# Patient Record
Sex: MALE | Race: BLACK OR AFRICAN AMERICAN | NOT HISPANIC OR LATINO
[De-identification: names, ages, dates, MRNs, and addresses within clinical notes are randomized per-mention and may not be internally consistent; named-entity substitution may affect disease eponyms.]

---

## 2018-02-06 ENCOUNTER — LABORATORY RESULT (OUTPATIENT)
Age: 33
End: 2018-02-06

## 2018-02-06 ENCOUNTER — TRANSCRIPTION ENCOUNTER (OUTPATIENT)
Age: 33
End: 2018-02-06

## 2018-02-06 ENCOUNTER — APPOINTMENT (OUTPATIENT)
Dept: INTERNAL MEDICINE | Facility: CLINIC | Age: 33
End: 2018-02-06
Payer: COMMERCIAL

## 2018-02-06 ENCOUNTER — FORM ENCOUNTER (OUTPATIENT)
Age: 33
End: 2018-02-06

## 2018-02-06 VITALS
SYSTOLIC BLOOD PRESSURE: 120 MMHG | HEART RATE: 75 BPM | OXYGEN SATURATION: 97 % | DIASTOLIC BLOOD PRESSURE: 90 MMHG | WEIGHT: 242 LBS | HEIGHT: 69 IN | BODY MASS INDEX: 35.84 KG/M2 | TEMPERATURE: 98.9 F

## 2018-02-06 DIAGNOSIS — Z78.9 OTHER SPECIFIED HEALTH STATUS: ICD-10-CM

## 2018-02-06 DIAGNOSIS — Z83.3 FAMILY HISTORY OF DIABETES MELLITUS: ICD-10-CM

## 2018-02-06 DIAGNOSIS — Z82.3 FAMILY HISTORY OF STROKE: ICD-10-CM

## 2018-02-06 PROCEDURE — 99385 PREV VISIT NEW AGE 18-39: CPT

## 2018-02-07 ENCOUNTER — APPOINTMENT (OUTPATIENT)
Dept: ULTRASOUND IMAGING | Facility: CLINIC | Age: 33
End: 2018-02-07
Payer: COMMERCIAL

## 2018-02-07 ENCOUNTER — OUTPATIENT (OUTPATIENT)
Dept: OUTPATIENT SERVICES | Facility: HOSPITAL | Age: 33
LOS: 1 days | End: 2018-02-07
Payer: COMMERCIAL

## 2018-02-07 DIAGNOSIS — Z00.8 ENCOUNTER FOR OTHER GENERAL EXAMINATION: ICD-10-CM

## 2018-02-07 LAB
HBV SURFACE AB SER QL: REACTIVE
HBV SURFACE AG SER QL: NONREACTIVE
HCV AB SER QL: NONREACTIVE
HCV S/CO RATIO: 0.23 S/CO
HIV1+2 AB SPEC QL IA.RAPID: NONREACTIVE
T PALLIDUM AB SER QL IA: NEGATIVE

## 2018-02-07 PROCEDURE — 76870 US EXAM SCROTUM: CPT | Mod: 26

## 2018-02-07 PROCEDURE — 76870 US EXAM SCROTUM: CPT

## 2018-02-08 LAB
25(OH)D3 SERPL-MCNC: 22.6 NG/ML
ALBUMIN SERPL ELPH-MCNC: 4.4 G/DL
ALP BLD-CCNC: 57 U/L
ALT SERPL-CCNC: 33 U/L
ANION GAP SERPL CALC-SCNC: 16 MMOL/L
APPEARANCE: CLEAR
AST SERPL-CCNC: 36 U/L
BASOPHILS # BLD AUTO: 0.02 K/UL
BASOPHILS NFR BLD AUTO: 0.2 %
BILIRUB SERPL-MCNC: 0.3 MG/DL
BILIRUBIN URINE: NEGATIVE
BLOOD URINE: NEGATIVE
BUN SERPL-MCNC: 19 MG/DL
C TRACH RRNA SPEC QL NAA+PROBE: NOT DETECTED
CALCIUM SERPL-MCNC: 10.6 MG/DL
CHLORIDE SERPL-SCNC: 97 MMOL/L
CHOLEST SERPL-MCNC: 237 MG/DL
CHOLEST/HDLC SERPL: 3.2 RATIO
CO2 SERPL-SCNC: 25 MMOL/L
COLOR: YELLOW
CREAT SERPL-MCNC: 1.35 MG/DL
EOSINOPHIL # BLD AUTO: 0.12 K/UL
EOSINOPHIL NFR BLD AUTO: 1.4 %
GLUCOSE QUALITATIVE U: NEGATIVE MG/DL
GLUCOSE SERPL-MCNC: 88 MG/DL
HBA1C MFR BLD HPLC: 6 %
HCT VFR BLD CALC: 46.3 %
HDLC SERPL-MCNC: 74 MG/DL
HGB BLD-MCNC: 15.8 G/DL
IMM GRANULOCYTES NFR BLD AUTO: 0.1 %
KETONES URINE: NEGATIVE
LDLC SERPL CALC-MCNC: 151 MG/DL
LEUKOCYTE ESTERASE URINE: NEGATIVE
LYMPHOCYTES # BLD AUTO: 3.38 K/UL
LYMPHOCYTES NFR BLD AUTO: 40.5 %
MAN DIFF?: NORMAL
MCHC RBC-ENTMCNC: 28.3 PG
MCHC RBC-ENTMCNC: 34.1 GM/DL
MCV RBC AUTO: 83 FL
MONOCYTES # BLD AUTO: 0.64 K/UL
MONOCYTES NFR BLD AUTO: 7.7 %
N GONORRHOEA RRNA SPEC QL NAA+PROBE: NOT DETECTED
NEUTROPHILS # BLD AUTO: 4.18 K/UL
NEUTROPHILS NFR BLD AUTO: 50.1 %
NITRITE URINE: NEGATIVE
PH URINE: 6
PLATELET # BLD AUTO: 272 K/UL
POTASSIUM SERPL-SCNC: 3.9 MMOL/L
PROT SERPL-MCNC: 8.7 G/DL
PROTEIN URINE: NEGATIVE MG/DL
RBC # BLD: 5.58 M/UL
RBC # FLD: 13.6 %
SODIUM SERPL-SCNC: 138 MMOL/L
SOURCE AMPLIFICATION: NORMAL
SPECIFIC GRAVITY URINE: 1.02
TRIGL SERPL-MCNC: 62 MG/DL
TSH SERPL-ACNC: 1.83 UIU/ML
UROBILINOGEN URINE: NEGATIVE MG/DL
WBC # FLD AUTO: 8.35 K/UL

## 2018-04-10 ENCOUNTER — APPOINTMENT (OUTPATIENT)
Dept: INTERNAL MEDICINE | Facility: CLINIC | Age: 33
End: 2018-04-10

## 2021-02-17 ENCOUNTER — TRANSCRIPTION ENCOUNTER (OUTPATIENT)
Age: 36
End: 2021-02-17

## 2021-03-02 ENCOUNTER — APPOINTMENT (OUTPATIENT)
Dept: INTERNAL MEDICINE | Facility: CLINIC | Age: 36
End: 2021-03-02
Payer: MEDICAID

## 2021-03-02 VITALS
BODY MASS INDEX: 36.14 KG/M2 | WEIGHT: 244 LBS | OXYGEN SATURATION: 98 % | SYSTOLIC BLOOD PRESSURE: 140 MMHG | TEMPERATURE: 98.3 F | HEART RATE: 78 BPM | DIASTOLIC BLOOD PRESSURE: 80 MMHG | HEIGHT: 69 IN

## 2021-03-02 VITALS — SYSTOLIC BLOOD PRESSURE: 118 MMHG | DIASTOLIC BLOOD PRESSURE: 76 MMHG

## 2021-03-02 DIAGNOSIS — K01.1 IMPACTED TEETH: ICD-10-CM

## 2021-03-02 DIAGNOSIS — Z23 ENCOUNTER FOR IMMUNIZATION: ICD-10-CM

## 2021-03-02 DIAGNOSIS — Z56.0 UNEMPLOYMENT, UNSPECIFIED: ICD-10-CM

## 2021-03-02 DIAGNOSIS — Z00.00 ENCOUNTER FOR GENERAL ADULT MEDICAL EXAMINATION W/OUT ABNORMAL FINDINGS: ICD-10-CM

## 2021-03-02 DIAGNOSIS — Z11.3 ENCOUNTER FOR SCREENING FOR INFECTIONS WITH A PREDOMINANTLY SEXUAL MODE OF TRANSMISSION: ICD-10-CM

## 2021-03-02 PROCEDURE — 99072 ADDL SUPL MATRL&STAF TM PHE: CPT

## 2021-03-02 PROCEDURE — 90472 IMMUNIZATION ADMIN EACH ADD: CPT

## 2021-03-02 PROCEDURE — 90715 TDAP VACCINE 7 YRS/> IM: CPT

## 2021-03-02 PROCEDURE — G0008: CPT

## 2021-03-02 PROCEDURE — 99395 PREV VISIT EST AGE 18-39: CPT | Mod: 25

## 2021-03-02 PROCEDURE — 90686 IIV4 VACC NO PRSV 0.5 ML IM: CPT

## 2021-03-02 SDOH — ECONOMIC STABILITY - INCOME SECURITY: UNEMPLOYMENT, UNSPECIFIED: Z56.0

## 2021-03-02 NOTE — COUNSELING
[Potential consequences of obesity discussed] : Potential consequences of obesity discussed [Benefits of weight loss discussed] : Benefits of weight loss discussed [Structured Weight Management Program suggested:] : Structured weight management program suggested [Encouraged to maintain food diary] : Encouraged to maintain food diary [Encouraged to increase physical activity] : Encouraged to increase physical activity [Encouraged to use exercise tracking device] : Encouraged to use exercise tracking device [Weigh Self Weekly] : weigh self weekly [Decrease Portions] : decrease portions [None] : None [Good understanding] : Patient has a good understanding of lifestyle changes and steps needed to achieve self management goal

## 2021-03-02 NOTE — HEALTH RISK ASSESSMENT
[Fair] :  ~his/her~ mood as fair [Yes] : Yes [2 - 4 times a month (2 pts)] : 2-4 times a month (2 points) [3 or 4 (1 pt)] : 3 or 4  (1 point) [Less than monthly (1 pt)] : Less than monthly (1 point) [No] : In the past 12 months have you used drugs other than those required for medical reasons? No [] : No [de-identified] : On rare occasions. [de-identified] : kickboxing occasionally and jogging. [de-identified] : cut down on carbs and sugars. [With Significant Other] : lives with significant other [Unemployed] : unemployed [] :  [# Of Children ___] : has [unfilled] children [Sexually Active] : sexually active [High Risk Behavior] : no high risk behavior [Feels Safe at Home] : Feels safe at home [Reports changes in dental health] : Reports no changes in dental health

## 2021-03-02 NOTE — HISTORY OF PRESENT ILLNESS
[FreeTextEntry1] : annual physical [de-identified] : Patient presents for his annual physical complaining of recent weight gain and seeing his hair shedding.  Patient repots 30 pound weight gain during the pandemic due to having lost his job and being at home.  Recently he started up on exercising and changing his diet by reducing sugar and carbohydrates and has lost the 30 pounds he gained.  He intends to continue with his weight loss attempt.  \par \par He is also concerned due to his seeing hair thinning at the temple, wonders if due to stress or not.  \par \par  for 2 years. Furloughed from his job as an [employer] for [employer].

## 2021-03-02 NOTE — PHYSICAL EXAM
[No Acute Distress] : no acute distress [Well Developed] : well developed [Normal Voice/Communication] : normal voice/communication [Normal Sclera/Conjunctiva] : normal sclera/conjunctiva [Normal TMs] : both tympanic membranes were normal [No Lymphadenopathy] : no lymphadenopathy [No Respiratory Distress] : no respiratory distress  [Clear to Auscultation] : lungs were clear to auscultation bilaterally [Regular Rhythm] : with a regular rhythm [Normal S1, S2] : normal S1 and S2 [No Murmur] : no murmur heard [No Abdominal Bruit] : a ~M bruit was not heard ~T in the abdomen [No Edema] : there was no peripheral edema [Soft] : abdomen soft [Non Tender] : non-tender [Non-distended] : non-distended [No Masses] : no abdominal mass palpated [No HSM] : no HSM [Normal Supraclavicular Nodes] : no supraclavicular lymphadenopathy [Normal Posterior Cervical Nodes] : no posterior cervical lymphadenopathy [Normal Anterior Cervical Nodes] : no anterior cervical lymphadenopathy [Grossly Normal Strength/Tone] : grossly normal strength/tone [Deep Tendon Reflexes (DTR)] : deep tendon reflexes were 2+ and symmetric [Speech Grossly Normal] : speech grossly normal [Normal Affect] : the affect was normal [Alert and Oriented x3] : oriented to person, place, and time [Normal Mood] : the mood was normal [de-identified] : some thinning of hair at temple

## 2021-03-02 NOTE — REVIEW OF SYSTEMS
[Recent Change In Weight] : ~T recent weight change [Hair Changes] : hair changes [Fever] : no fever [Vision Problems] : no vision problems [Hearing Loss] : no hearing loss [Chest Pain] : no chest pain [Palpitations] : no palpitations [Lower Ext Edema] : no lower extremity edema [Shortness Of Breath] : no shortness of breath [Wheezing] : no wheezing [Cough] : no cough [Dyspnea on Exertion] : not dyspnea on exertion [Abdominal Pain] : no abdominal pain [Nausea] : no nausea [Constipation] : no constipation [Diarrhea] : no diarrhea [Vomiting] : no vomiting [Heartburn] : no heartburn [Dysuria] : no dysuria [Hesitancy] : no hesitancy [Hematuria] : no hematuria [Joint Pain] : no joint pain [Headache] : no headache [Insomnia] : no insomnia [Anxiety] : no anxiety [Depression] : no depression [Easy Bleeding] : no easy bleeding [Easy Bruising] : no easy bruising [FreeTextEntry2] : see hpi [de-identified] : dry scalp in spite of moisturizing

## 2021-03-04 DIAGNOSIS — E78.5 HYPERLIPIDEMIA, UNSPECIFIED: ICD-10-CM

## 2021-03-04 LAB
ALBUMIN SERPL ELPH-MCNC: 4.8 G/DL
ALP BLD-CCNC: 64 U/L
ALT SERPL-CCNC: 17 U/L
ANION GAP SERPL CALC-SCNC: 11 MMOL/L
AST SERPL-CCNC: 17 U/L
BILIRUB SERPL-MCNC: 0.6 MG/DL
BUN SERPL-MCNC: 11 MG/DL
CALCIUM SERPL-MCNC: 10.4 MG/DL
CHLORIDE SERPL-SCNC: 102 MMOL/L
CHOLEST SERPL-MCNC: 232 MG/DL
CO2 SERPL-SCNC: 26 MMOL/L
CREAT SERPL-MCNC: 1.24 MG/DL
ESTIMATED AVERAGE GLUCOSE: 123 MG/DL
GLUCOSE SERPL-MCNC: 92 MG/DL
HBA1C MFR BLD HPLC: 5.9 %
HDLC SERPL-MCNC: 49 MG/DL
LDLC SERPL CALC-MCNC: 168 MG/DL
NONHDLC SERPL-MCNC: 183 MG/DL
POTASSIUM SERPL-SCNC: 4.2 MMOL/L
PROT SERPL-MCNC: 8.3 G/DL
SODIUM SERPL-SCNC: 139 MMOL/L
TRIGL SERPL-MCNC: 76 MG/DL
TSH SERPL-ACNC: 0.95 UIU/ML

## 2021-03-05 ENCOUNTER — NON-APPOINTMENT (OUTPATIENT)
Age: 36
End: 2021-03-05

## 2021-03-05 ENCOUNTER — APPOINTMENT (OUTPATIENT)
Dept: DERMATOLOGY | Facility: CLINIC | Age: 36
End: 2021-03-05
Payer: MEDICAID

## 2021-03-05 VITALS — WEIGHT: 238 LBS | BODY MASS INDEX: 33.32 KG/M2 | HEIGHT: 71 IN

## 2021-03-05 DIAGNOSIS — L65.8 OTHER SPECIFIED NONSCARRING HAIR LOSS: ICD-10-CM

## 2021-03-05 DIAGNOSIS — L64.9 ANDROGENIC ALOPECIA, UNSPECIFIED: ICD-10-CM

## 2021-03-05 PROCEDURE — 99072 ADDL SUPL MATRL&STAF TM PHE: CPT

## 2021-03-05 PROCEDURE — 99203 OFFICE O/P NEW LOW 30 MIN: CPT

## 2021-03-17 ENCOUNTER — TRANSCRIPTION ENCOUNTER (OUTPATIENT)
Age: 36
End: 2021-03-17

## 2021-03-17 ENCOUNTER — NON-APPOINTMENT (OUTPATIENT)
Age: 36
End: 2021-03-17

## 2021-03-18 ENCOUNTER — APPOINTMENT (OUTPATIENT)
Dept: INTERNAL MEDICINE | Facility: CLINIC | Age: 36
End: 2021-03-18

## 2021-03-19 ENCOUNTER — NON-APPOINTMENT (OUTPATIENT)
Age: 36
End: 2021-03-19

## 2021-03-31 ENCOUNTER — APPOINTMENT (OUTPATIENT)
Dept: UROLOGY | Facility: CLINIC | Age: 36
End: 2021-03-31
Payer: MEDICAID

## 2021-03-31 VITALS
SYSTOLIC BLOOD PRESSURE: 129 MMHG | HEART RATE: 81 BPM | TEMPERATURE: 97.8 F | HEIGHT: 71 IN | WEIGHT: 235 LBS | BODY MASS INDEX: 32.9 KG/M2 | DIASTOLIC BLOOD PRESSURE: 84 MMHG | RESPIRATION RATE: 17 BRPM

## 2021-03-31 DIAGNOSIS — L72.9 FOLLICULAR CYST OF THE SKIN AND SUBCUTANEOUS TISSUE, UNSPECIFIED: ICD-10-CM

## 2021-03-31 DIAGNOSIS — L65.9 NONSCARRING HAIR LOSS, UNSPECIFIED: ICD-10-CM

## 2021-03-31 PROCEDURE — 99204 OFFICE O/P NEW MOD 45 MIN: CPT

## 2021-03-31 PROCEDURE — 99072 ADDL SUPL MATRL&STAF TM PHE: CPT

## 2021-03-31 RX ORDER — SULFAMETHOXAZOLE AND TRIMETHOPRIM 800; 160 MG/1; MG/1
800-160 TABLET ORAL
Qty: 28 | Refills: 0 | Status: ACTIVE | COMMUNITY
Start: 2021-03-31 | End: 1900-01-01

## 2021-03-31 RX ORDER — ACETAMINOPHEN 325 MG
5000 TABLET ORAL
Refills: 0 | Status: ACTIVE | COMMUNITY
Start: 2021-03-31

## 2021-03-31 RX ORDER — IBUPROFEN 600 MG/1
600 TABLET, FILM COATED ORAL
Qty: 120 | Refills: 11 | Status: ACTIVE | COMMUNITY
Start: 2021-03-31 | End: 1900-01-01

## 2021-04-20 ENCOUNTER — APPOINTMENT (OUTPATIENT)
Dept: UROLOGY | Facility: CLINIC | Age: 36
End: 2021-04-20

## 2021-05-01 NOTE — LETTER HEADER
[FreeTextEntry3] : Tae gAgarwal MD, PhD\par [facility]\par [address]\par [address] [place] [address]\[place] [address]

## 2021-05-01 NOTE — LETTER BODY
[Dear  ___] : Dear  [unfilled], [Consult Letter:] : I had the pleasure of evaluating your patient, [unfilled]. [Please see my note below.] : Please see my note below. [Consult Closing:] : Thank you very much for allowing me to participate in the care of this patient.  If you have any questions, please do not hesitate to contact me. [Sincerely,] : Sincerely, [FreeTextEntry2] : Dr. Rosita Perez\par [address]\[place] [address] [FreeTextEntry1] : 03/31/2021: Mr. Sicard is a 34y/o M presenting today for evaluation of pain in right testicle. Has had pain for 2 weeks. Went to  and had US which found testicular cysts b/l. Pain is intermittent and not severe. Pain comes on suddenly and resolves after a few minutes. Will have it 1-2x a day. Pain worsens if there is something contricting the testicle such as tight pants, or if sitting in "awkward" position. Does not worsen with heavy lifting or if sitting for long time. Does note he hit the right testicle on his counter and did not have the pain before then. Pain has been unchanged since the first time he felt it. Takes ibuprofen which helps with the pain. Erections and urination are good. Denies nocturia, pushing or straining, dysuria, infection, injury, discharge from penis. Denies constipation or chest pain. No hx of hernia. No hx of asthma, diabetes. PSHx of tooth extraction. +FHx of diabetes (mother).  , will be trying to have children in the summer. Works as  for [employer]. Not employed currently due to COVID. Jogs every day for exercise. No allergies. \par \par I prescribed the patient Bactrim BID x 2 weeks with food for right epididymitis. I advised the patient there is a chance of kidney insufficiency and upset stomach. Renewed  ibuprofen 600mg every 6 hours four times a day PRN for the pain. Instructed to take with food to avoid stomach upset. \par Recommend to avoid strenuous activity. \par \par The patient produced a urine sample which will be sent for urinalysis, urine cytology, and urine culture. \par \par RTO in 2 weeks for reassessment or sooner if pain worsens.  [FreeTextEntry3] : Tae Aggarwal MD, PhD

## 2021-05-01 NOTE — ADDENDUM
[FreeTextEntry1] : I, Dalia Cortezin, acted solely as a scribe for Dr. Tae Aggarwal on this date 03/31/2021.\par \par All medical record entries made by the Scribe were at my, Dr. Tae Aggarwal, direction and personally dictated by me on 03/31/2021. I have reviewed the chart and agree that the record accurately reflects my personal performance of the history, physical exam, assessment and plan.  I have also personally directed, reviewed and agreed with the chart.

## 2021-05-01 NOTE — ASSESSMENT
[FreeTextEntry1] : 03/31/2021: Mr. Sicard is a 34y/o M presenting today for evaluation of pain in right testicle. Has had pain for 2 weeks. Went to  and had US which found testicular cysts b/l. Pain is intermittent and not severe. Pain comes on suddenly and resolves after a few minutes. Will have it 1-2x a day. Pain worsens if there is something contricting the testicle such as tight pants, or if sitting in "awkward" position. Does not worsen with heavy lifting or if sitting for long time. Does note he hit the right testicle on his counter and did not have the pain before then. Pain has been unchanged since the first time he felt it. Takes ibuprofen which helps with the pain. Erections and urination are good. Denies nocturia, pushing or straining, dysuria, infection, injury, discharge from penis. Denies constipation or chest pain. No hx of hernia. No hx of asthma, diabetes. PSHx of tooth extraction. +FHx of diabetes (mother).  , will be trying to have children in the summer. Works as  for [employer]. Not employed currently due to COVID. Jogs every day for exercise. No allergies. \par \par I prescribed the patient Bactrim BID x 2 weeks with food for right epididymitis. I advised the patient there is a chance of kidney insufficiency and upset stomach. Renewed  ibuprofen 600mg every 6 hours four times a day PRN for the pain. Instructed to take with food to avoid stomach upset. \par Recommend to avoid strenuous activity. \par \par The patient produced a urine sample which will be sent for urinalysis, urine cytology, and urine culture. \par \par RTO in 2 weeks for reassessment or sooner if pain worsens. \par \par Preparation, in-person office visit, and coordination of care took: 45 minutes

## 2021-05-01 NOTE — PHYSICAL EXAM
[General Appearance - Well Developed] : well developed [Normal Appearance] : normal appearance [General Appearance - In No Acute Distress] : no acute distress [Edema] : no peripheral edema [Respiration, Rhythm And Depth] : normal respiratory rhythm and effort [Exaggerated Use Of Accessory Muscles For Inspiration] : no accessory muscle use [Abdomen Soft] : soft [Abdomen Tenderness] : non-tender [Normal Station and Gait] : the gait and station were normal for the patient's age [] : no rash [No Focal Deficits] : no focal deficits [Oriented To Time, Place, And Person] : oriented to person, place, and time [Affect] : the affect was normal [Mood] : the mood was normal [Not Anxious] : not anxious [Heart Rate And Rhythm] : Heart rate and rhythm were normal [Urethral Meatus] : meatus normal [Penis Abnormality] : normal circumcised penis [Testes Tenderness] : no tenderness of the testes [Testes Mass (___cm)] : there were no testicular masses [FreeTextEntry1] : hand  5/5 bilaterally.

## 2021-05-01 NOTE — HISTORY OF PRESENT ILLNESS
[FreeTextEntry1] : 03/31/2021: Mr. Sicard is a 36y/o M presenting today for evaluation of pain in right testicle. Has had pain for 2 weeks. Went to  and had US which found testicular cysts b/l. Pain is intermittent and not severe. Pain comes on suddenly and resolves after a few minutes. Will have it 1-2x a day. Pain worsens if there is something contricting the testicle such as tight pants, or if sitting in "awkward" position. Does not worsen with heavy lifting or if sitting for long time. Does note he hit the right testicle on his counter and did not have the pain before then. Pain has been unchanged since the first time he felt it. Takes ibuprofen which helps with the pain. Erections and urination are good. Denies nocturia, pushing or straining, dysuria, infection, injury, discharge from penis. Denies constipation or chest pain. No hx of hernia. No hx of asthma, diabetes. PSHx of tooth extraction. +FHx of diabetes (mother).  , will be trying to have children in the summer. Works as  for [employer]. Not employed currently due to COVID. Jogs every day for exercise. No allergies.

## 2021-05-11 ENCOUNTER — APPOINTMENT (OUTPATIENT)
Dept: UROLOGY | Facility: CLINIC | Age: 36
End: 2021-05-11

## 2021-07-01 ENCOUNTER — APPOINTMENT (OUTPATIENT)
Dept: UROLOGY | Facility: CLINIC | Age: 36
End: 2021-07-01

## 2021-07-30 ENCOUNTER — TRANSCRIPTION ENCOUNTER (OUTPATIENT)
Age: 36
End: 2021-07-30

## 2021-09-14 ENCOUNTER — APPOINTMENT (OUTPATIENT)
Dept: INTERNAL MEDICINE | Facility: CLINIC | Age: 36
End: 2021-09-14

## 2021-09-16 ENCOUNTER — APPOINTMENT (OUTPATIENT)
Dept: DERMATOLOGY | Facility: CLINIC | Age: 36
End: 2021-09-16

## 2021-11-04 ENCOUNTER — TRANSCRIPTION ENCOUNTER (OUTPATIENT)
Age: 36
End: 2021-11-04

## 2022-02-03 ENCOUNTER — APPOINTMENT (OUTPATIENT)
Dept: UROLOGY | Facility: CLINIC | Age: 37
End: 2022-02-03
Payer: MEDICAID

## 2022-02-03 DIAGNOSIS — N45.1 EPIDIDYMITIS: ICD-10-CM

## 2022-02-03 DIAGNOSIS — N44.2 BENIGN CYST OF TESTIS: ICD-10-CM

## 2022-02-03 DIAGNOSIS — N50.819 TESTICULAR PAIN, UNSPECIFIED: ICD-10-CM

## 2022-02-03 DIAGNOSIS — D89.0 POLYCLONAL HYPERGAMMAGLOBULINEMIA: ICD-10-CM

## 2022-02-03 PROCEDURE — 99214 OFFICE O/P EST MOD 30 MIN: CPT

## 2022-02-05 PROBLEM — N50.819 TESTICULAR PAIN: Status: ACTIVE | Noted: 2021-03-31

## 2022-02-05 PROBLEM — D89.0 POLYCLONAL GAMMOPATHY: Status: ACTIVE | Noted: 2018-02-08

## 2022-02-05 PROBLEM — N45.1 RIGHT EPIDIDYMITIS: Status: ACTIVE | Noted: 2021-03-31

## 2022-02-05 PROBLEM — N44.2 TESTICULAR CYST: Status: ACTIVE | Noted: 2022-02-05

## 2022-02-05 NOTE — ASSESSMENT
[FreeTextEntry1] : 03/31/2021: Mr. Sicard is a 34y/o M presenting today for evaluation of pain in right testicle. Has had pain for 2 weeks. Went to  and had US which found testicular cysts b/l. Pain is intermittent and not severe. Pain comes on suddenly and resolves after a few minutes. Will have it 1-2x a day. Pain worsens if there is something contricting the testicle such as tight pants, or if sitting in "awkward" position. Does not worsen with heavy lifting or if sitting for long time. Does note he hit the right testicle on his counter and did not have the pain before then. Pain has been unchanged since the first time he felt it. Takes ibuprofen which helps with the pain. Erections and urination are good. Denies nocturia, pushing or straining, dysuria, infection, injury, discharge from penis. Denies constipation or chest pain. No hx of hernia. No hx of asthma, diabetes. PSHx of tooth extraction. +FHx of diabetes (mother).  , will be trying to have children in the summer. Works as  for [employer]. Not employed currently due to COVID. Jogs every day for exercise. No allergies. \par \par 02/03/2022: Patient presents today for urological evaluation. The patient is feeling well today and offers no urological complaints. He is joining [employer] and requires clearance from his physicians.  No pain in scrotum. No problems with urination. Denies gross hematuria,  urgency, pushing/straining, and frequency . No erectile dysfunction. No seizures or diabetes. He reports his wife is expecting their first child. Works from home as  for [employer]. No FHx of prostate cancer. +FHx of diabetes. \par \par On exam, circumcised, normal scrotum. Bell-clapper position lie to the left testicle wrong axis runs parallel to floor. Lack of glandulare tendon causing altered axis position, not pathologic. Left epididymitis, testicle, and spermatic cord were nontender. No left varicocele. The right testis is nontender as is the spermatic cord and epididymis. No intratesticular tumors on either side. \par \par I personally reviewed the images and report of the scrotal US from 3/17/21. The US showed bilateral testicular cysts. There is evidence of a single subcentimeter sonolucent focus in each testicle, consistent with simple cysts. Normal flow to both testicles with no sonographic findings to suggest the presence of testicular torsion. \par There is no evidence of neoplasia in the cysts. The cysts are benign. \par \par I encouraged him to drink at least 2 qts of water per day.\par \par The patient is cleared by me for a urological perspective. After reviewing the images, reports, and the physical exam, I am comfortable that the patient can participate in the [employer] without any restriction of activity. If there are any further questions, the patient should not hesitate to reach out to me. \par \par RTO as needed. \par \par Preparation, in-person office visit, and coordination of care took: 30 minutes

## 2022-02-05 NOTE — ADDENDUM
[FreeTextEntry1] : I, Marsha Sánchez, acted solely as a scribe for Dr. Tae Aggarwal on this date 02/03/2022.\par \par All medical record entries made by the Scribe were at my, Dr. Tae Aggarwal, direction and personally dictated by me on 02/03/2022. I have reviewed the chart and agree that the record accurately reflects my personal performance of the history, physical exam, assessment and plan. I have also personally directed, reviewed and agreed with the chart.

## 2022-02-05 NOTE — PHYSICAL EXAM
[General Appearance - Well Developed] : well developed [General Appearance - Well Nourished] : well nourished [Normal Appearance] : normal appearance [Well Groomed] : well groomed [General Appearance - In No Acute Distress] : no acute distress [Abdomen Soft] : soft [Abdomen Tenderness] : non-tender [Edema] : no peripheral edema [] : no respiratory distress [Respiration, Rhythm And Depth] : normal respiratory rhythm and effort [Exaggerated Use Of Accessory Muscles For Inspiration] : no accessory muscle use [Oriented To Time, Place, And Person] : oriented to person, place, and time [Affect] : the affect was normal [Mood] : the mood was normal [Not Anxious] : not anxious [Normal Station and Gait] : the gait and station were normal for the patient's age [No Focal Deficits] : no focal deficits [FreeTextEntry1] : On exam, circumcised, normal scrotum. Bell-clapper position lie to the left testicle wrong axis runs parallel to floor. Lack of glandulare tendon causing altered axis position, not pathologic. Left epididymitis, testicle, and spermatic cord were nontender. No left varicocele. The right testis is nontender as is the spermatic cord and epididymis. No intratesticular tumors on either side.

## 2022-02-05 NOTE — HISTORY OF PRESENT ILLNESS
[FreeTextEntry1] : 03/31/2021: Mr. Sicard is a 36y/o M presenting today for evaluation of pain in right testicle. Has had pain for 2 weeks. Went to  and had US which found testicular cysts b/l. Pain is intermittent and not severe. Pain comes on suddenly and resolves after a few minutes. Will have it 1-2x a day. Pain worsens if there is something contricting the testicle such as tight pants, or if sitting in "awkward" position. Does not worsen with heavy lifting or if sitting for long time. Does note he hit the right testicle on his counter and did not have the pain before then. Pain has been unchanged since the first time he felt it. Takes ibuprofen which helps with the pain. Erections and urination are good. Denies nocturia, pushing or straining, dysuria, infection, injury, discharge from penis. Denies constipation or chest pain. No hx of hernia. No hx of asthma, diabetes. PSHx of tooth extraction. +FHx of diabetes (mother).  , will be trying to have children in the summer. Works as  for [employer]. Not employed currently due to COVID. Jogs every day for exercise. No allergies. \par \par 02/03/2022: Patient presents today for urological evaluation. The patient is feeling well today and offers no urological complaints. He is joining [employer] and requires clearance from his physicians.  No pain in scrotum. No problems with urination. Denies gross hematuria,  urgency, pushing/straining, and frequency . No erectile dysfunction. No seizures or diabetes. He reports his wife is expecting their first child. Works from home as  for [employer]. No FHx of prostate cancer. +FHx of diabetes.

## 2022-10-14 ENCOUNTER — APPOINTMENT (OUTPATIENT)
Dept: INTERNAL MEDICINE | Facility: CLINIC | Age: 37
End: 2022-10-14

## 2023-04-10 NOTE — REVIEW OF SYSTEMS
[Negative] : Heme/Lymph
PAST SURGICAL HISTORY:  Cataracts, both eyes     S/P cholecystectomy     S/P knee surgery

## 2024-06-22 ENCOUNTER — NON-APPOINTMENT (OUTPATIENT)
Age: 39
End: 2024-06-22

## 2024-10-14 ENCOUNTER — NON-APPOINTMENT (OUTPATIENT)
Age: 39
End: 2024-10-14

## 2024-11-27 ENCOUNTER — APPOINTMENT (OUTPATIENT)
Dept: INTERNAL MEDICINE | Facility: CLINIC | Age: 39
End: 2024-11-27

## 2024-12-13 ENCOUNTER — APPOINTMENT (OUTPATIENT)
Dept: INTERNAL MEDICINE | Facility: CLINIC | Age: 39
End: 2024-12-13